# Patient Record
Sex: FEMALE | Race: BLACK OR AFRICAN AMERICAN | NOT HISPANIC OR LATINO | Employment: UNEMPLOYED | URBAN - METROPOLITAN AREA
[De-identification: names, ages, dates, MRNs, and addresses within clinical notes are randomized per-mention and may not be internally consistent; named-entity substitution may affect disease eponyms.]

---
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
## 2024-01-05 ENCOUNTER — OFFICE VISIT (OUTPATIENT)
Dept: OPHTHALMOLOGY | Facility: CLINIC | Age: 19
End: 2024-01-05
Payer: COMMERCIAL

## 2024-01-05 DIAGNOSIS — H52.213 IRREGULAR ASTIGMATISM OF BOTH EYES: ICD-10-CM

## 2024-01-05 DIAGNOSIS — H18.623 KERATOCONUS, UNSTABLE, BILATERAL: Primary | ICD-10-CM

## 2024-01-05 LAB
KMAX (OD): 80 DIOPTERS
KMAX (OS): 81.5 DIOPTERS
PACH THINNEST (OD): 395 MICRONS
PACH THINNEST (OS): 359 MICRONS
SIMK FLAT (OD): 62.8 DIOPTERS
SIMK FLAT (OS): 65.5 DIOPTERS
SIMK STEEP (OD): 68.7 DIOPTERS
SIMK STEEP (OS): 72.9 DIOPTERS

## 2024-01-05 PROCEDURE — 99213 OFFICE O/P EST LOW 20 MIN: CPT | Performed by: OPHTHALMOLOGY

## 2024-01-05 PROCEDURE — 92025 CPTRIZED CORNEAL TOPOGRAPHY: CPT | Performed by: OPHTHALMOLOGY

## 2024-01-05 ASSESSMENT — CONF VISUAL FIELD
OD_SUPERIOR_NASAL_RESTRICTION: 0
OD_INFERIOR_NASAL_RESTRICTION: 0
OS_INFERIOR_TEMPORAL_RESTRICTION: 0
OD_NORMAL: 1
OS_SUPERIOR_NASAL_RESTRICTION: 0
OS_INFERIOR_NASAL_RESTRICTION: 0
OS_SUPERIOR_TEMPORAL_RESTRICTION: 0
OS_NORMAL: 1
OD_INFERIOR_TEMPORAL_RESTRICTION: 0
OD_SUPERIOR_TEMPORAL_RESTRICTION: 0

## 2024-01-05 ASSESSMENT — TONOMETRY
IOP_METHOD: GOLDMANN APPLANATION
OD_IOP_MMHG: 11
OS_IOP_MMHG: 11

## 2024-01-05 ASSESSMENT — REFRACTION_MANIFEST
OS_CYLINDER: -4.75
OD_SPHERE: -4.00
OD_AXIS: 180
OD_CYLINDER: -5.75
OS_SPHERE: -7.50
OS_AXIS: 065

## 2024-01-05 ASSESSMENT — VISUAL ACUITY
OS_PH_SC: 20/200
OD_PH_SC+: -1
OD_PH_SC: 20/100
OD_SC: 20/400
METHOD: SNELLEN - LINEAR
OS_SC: CF

## 2024-01-05 ASSESSMENT — EXTERNAL EXAM - RIGHT EYE: OD_EXAM: NORMAL

## 2024-01-05 ASSESSMENT — SLIT LAMP EXAM - LIDS
COMMENTS: NORMAL
COMMENTS: NORMAL

## 2024-01-05 ASSESSMENT — EXTERNAL EXAM - LEFT EYE: OS_EXAM: NORMAL

## 2024-01-05 NOTE — PROGRESS NOTES
Assessment/Plan   Diagnoses and all orders for this visit:  Keratoconus, unstable, bilateral  Irregular astigmatism of both eyes  -     Corneal Topography - OU - Both Eyes  Some progression noted OD  S/p CXL OS.... some flattening seen OS today on pentacam  Pt does not prefer to wear CLs  Reprinted mrx from Dr. Horn's visit  Strongly advised pt to get CXL OD ASAP

## 2024-01-26 ENCOUNTER — OFFICE VISIT (OUTPATIENT)
Dept: OBSTETRICS AND GYNECOLOGY | Facility: HOSPITAL | Age: 19
End: 2024-01-26
Payer: COMMERCIAL

## 2024-01-26 ENCOUNTER — LAB (OUTPATIENT)
Dept: LAB | Facility: LAB | Age: 19
End: 2024-01-26
Payer: COMMERCIAL

## 2024-01-26 VITALS
SYSTOLIC BLOOD PRESSURE: 123 MMHG | DIASTOLIC BLOOD PRESSURE: 85 MMHG | HEIGHT: 60 IN | WEIGHT: 145 LBS | BODY MASS INDEX: 28.47 KG/M2

## 2024-01-26 DIAGNOSIS — N95.0 POSTMENOPAUSAL BLEEDING: ICD-10-CM

## 2024-01-26 DIAGNOSIS — Z76.89 ENCOUNTER TO ESTABLISH CARE: ICD-10-CM

## 2024-01-26 DIAGNOSIS — Z01.419 WELL WOMAN EXAM: Primary | ICD-10-CM

## 2024-01-26 DIAGNOSIS — N92.6 IRREGULAR PERIODS: ICD-10-CM

## 2024-01-26 LAB
DHEA-S SERPL-MCNC: 326 UG/DL (ref 65–395)
EST. AVERAGE GLUCOSE BLD GHB EST-MCNC: 100 MG/DL
ESTRADIOL SERPL-MCNC: 63 PG/ML
FSH SERPL-ACNC: 5.3 IU/L
HBA1C MFR BLD: 5.1 %
PROLACTIN SERPL-MCNC: 6.4 UG/L (ref 3–20)
TSH SERPL-ACNC: 0.48 MIU/L (ref 0.44–3.98)

## 2024-01-26 PROCEDURE — 99395 PREV VISIT EST AGE 18-39: CPT | Performed by: ADVANCED PRACTICE MIDWIFE

## 2024-01-26 PROCEDURE — 82627 DEHYDROEPIANDROSTERONE: CPT

## 2024-01-26 PROCEDURE — 83001 ASSAY OF GONADOTROPIN (FSH): CPT

## 2024-01-26 PROCEDURE — 1036F TOBACCO NON-USER: CPT | Performed by: ADVANCED PRACTICE MIDWIFE

## 2024-01-26 PROCEDURE — 84402 ASSAY OF FREE TESTOSTERONE: CPT

## 2024-01-26 PROCEDURE — 83498 ASY HYDROXYPROGESTERONE 17-D: CPT

## 2024-01-26 PROCEDURE — 82670 ASSAY OF TOTAL ESTRADIOL: CPT

## 2024-01-26 PROCEDURE — 84146 ASSAY OF PROLACTIN: CPT

## 2024-01-26 PROCEDURE — 99213 OFFICE O/P EST LOW 20 MIN: CPT | Performed by: ADVANCED PRACTICE MIDWIFE

## 2024-01-26 PROCEDURE — 36415 COLL VENOUS BLD VENIPUNCTURE: CPT

## 2024-01-26 PROCEDURE — 84443 ASSAY THYROID STIM HORMONE: CPT

## 2024-01-26 PROCEDURE — 83036 HEMOGLOBIN GLYCOSYLATED A1C: CPT

## 2024-01-26 NOTE — PROGRESS NOTES
Subjective   18 y.o. yo nullip presents for well woman exam.   Concerns: was seen at [facility] and told she had PCOS d/t irregular menses and hair growth. Did not have labs or US. Patient is concerned about fertility and desires pregnancy. Reports she was trying but has stopped until she figures out what is going on.     Patient's last menstrual period was 2024.   Periods are  irregular - has phases of regular periods, then will skip months, had period twice in 1 month , lasting 7 days.   Dysmenorrhea:mild, occurring premenstrually and first 1-2 days of flow.   Cyclic symptoms include none.   Periods have been irregular for about 1 year  Went on depo in 9th grade - did not like side effects of hormones and weight gain     Sexual Activity: not sexually active in the last 7 months. Previously sexually active with men  Pain with intercourse? No     History of prior STI: gonorrhea    Current contraception: none    Last pap: n/a  Family history of uterine or ovarian cancer: no  Family history of breast cancer: no  Family history of colon cancer: no  Menstrual History:  OB History          0    Para   0    Term   0       0    AB   0    Living   0         SAB   0    IAB   0    Ectopic   0    Multiple   0    Live Births   0                Menarche age: 10  Patient's last menstrual period was 2024.       Objective   /85   Ht 1.524 m (5')   Wt 65.8 kg (145 lb)   LMP 2024   BMI 28.32 kg/m²   OBGyn Exam   General: alert and oriented, in no acute distress  Thyroid: thyroid exam: normal to inspection and palpation  Lungs: clear to auscultation bilaterally and normal respiratory effort   Breasts: breasts appear normal for pregnancy, no suspicious masses, no skin or nipple changes or axillary nodes, not examined  Abdomen: soft, non-tender, without masses or organomegaly  Neuro: age-appropriate affect, behavior and speech, no gross motor deficits, normal gait  Psych: psych exam:  alert,oriented, in NAD with a full range of affect, normal behavior and no psychotic features      Assessment/Plan   Problem List Items Addressed This Visit             ICD-10-CM    Well woman exam - Primary Z01.419      Irregular periods     N92.6    Relevant Orders    17-Hydroxyprogesterone    DHEA-Sulfate    Estradiol    Follicle Stimulating Hormone    Hemoglobin A1C    Prolactin    Testosterone,Free and Total    TSH with reflex to Free T4 if abnormal    US pelvis    Encounter to establish care     Z76.89    Relevant Orders    Referral to Primary Care        Discussed diagnosis and treatment of PCOS. Will begin with labs and pelvic US. Patient to return in 1 month to discuss results and recommended follow up/treatment options.     Asuncion Veliz, APRN-CNM, APRN-CNP

## 2024-01-26 NOTE — LETTER
January 26, 2024     Patient: Shyann Fulton   YOB: 2005   Date of Visit: 1/26/2024       To Whom It May Concern:    Shyann Fulton was seen in my clinic on 1/26/2024 at 11:30am. Please excuse Shyann for her absence from school on this day to make the appointment.    If you have any questions or concerns, please don't hesitate to call.         Sincerely,         Asuncion Veliz, KJ-CNDARLING, KJ-CNP        CC: No Recipients

## 2024-01-31 ENCOUNTER — TELEPHONE (OUTPATIENT)
Dept: OBSTETRICS AND GYNECOLOGY | Facility: HOSPITAL | Age: 19
End: 2024-01-31
Payer: COMMERCIAL

## 2024-01-31 LAB
17OHP SERPL-MCNC: 75.23 NG/DL
TESTOSTERONE FREE (CHAN): 8.7 PG/ML (ref 0.1–6.4)
TESTOSTERONE,TOTAL,LC-MS/MS: 57 NG/DL (ref 2–45)

## 2024-02-01 NOTE — TELEPHONE ENCOUNTER
Verified patient by name and .   Patient calling in regarding results.   Let patient know Asuncion is currently out of the office and the covering provider just got back to me regarding results.   Per Flori Mcgovern CNM, results are consistent with PCOS.  Let patient know she can discuss next steps with Asuncion at her follow up visit she has scheduled.   Patient also inquiring of whether she shoukld get US scheduled for tomorrow.   Let patient know she should still get US and Asuncion can discuss results at follow up appointment scheduled.

## 2024-02-02 ENCOUNTER — APPOINTMENT (OUTPATIENT)
Dept: RADIOLOGY | Facility: HOSPITAL | Age: 19
End: 2024-02-02
Payer: COMMERCIAL

## 2024-02-13 ENCOUNTER — APPOINTMENT (OUTPATIENT)
Dept: RADIOLOGY | Facility: HOSPITAL | Age: 19
End: 2024-02-13
Payer: COMMERCIAL

## 2024-02-13 ENCOUNTER — OFFICE VISIT (OUTPATIENT)
Dept: OPHTHALMOLOGY | Facility: CLINIC | Age: 19
End: 2024-02-13
Payer: COMMERCIAL

## 2024-02-13 DIAGNOSIS — H52.213 IRREGULAR ASTIGMATISM OF BOTH EYES: ICD-10-CM

## 2024-02-13 DIAGNOSIS — H18.623 KERATOCONUS, UNSTABLE, BILATERAL: Primary | ICD-10-CM

## 2024-02-13 PROCEDURE — 1036F TOBACCO NON-USER: CPT | Performed by: STUDENT IN AN ORGANIZED HEALTH CARE EDUCATION/TRAINING PROGRAM

## 2024-02-13 PROCEDURE — V2531 CONTACT LENS GAS PERMEABLE: HCPCS | Performed by: STUDENT IN AN ORGANIZED HEALTH CARE EDUCATION/TRAINING PROGRAM

## 2024-02-13 PROCEDURE — 99212 OFFICE O/P EST SF 10 MIN: CPT | Performed by: STUDENT IN AN ORGANIZED HEALTH CARE EDUCATION/TRAINING PROGRAM

## 2024-02-13 ASSESSMENT — REFRACTION_MANIFEST
OD_SPHERE: -4.00
OD_AXIS: 180
OS_AXIS: 065
OS_SPHERE: -7.50
OD_CYLINDER: -5.75
OS_CYLINDER: -4.75

## 2024-02-13 ASSESSMENT — VISUAL ACUITY
OS_SC: 20/200
METHOD: SNELLEN - LINEAR
OD_SC: 20/80

## 2024-02-13 ASSESSMENT — ENCOUNTER SYMPTOMS
CARDIOVASCULAR NEGATIVE: 0
EYES NEGATIVE: 1
RESPIRATORY NEGATIVE: 0
NEUROLOGICAL NEGATIVE: 0
ALLERGIC/IMMUNOLOGIC NEGATIVE: 0
ENDOCRINE NEGATIVE: 0
CONSTITUTIONAL NEGATIVE: 0
MUSCULOSKELETAL NEGATIVE: 0
PSYCHIATRIC NEGATIVE: 0
HEMATOLOGIC/LYMPHATIC NEGATIVE: 0
GASTROINTESTINAL NEGATIVE: 0

## 2024-02-13 ASSESSMENT — CONF VISUAL FIELD
OD_NORMAL: 1
OS_INFERIOR_TEMPORAL_RESTRICTION: 0
OD_INFERIOR_TEMPORAL_RESTRICTION: 0
METHOD: COUNTING FINGERS
OS_INFERIOR_NASAL_RESTRICTION: 0
OS_SUPERIOR_NASAL_RESTRICTION: 0
OD_SUPERIOR_NASAL_RESTRICTION: 0
OD_SUPERIOR_TEMPORAL_RESTRICTION: 0
OS_NORMAL: 1
OD_INFERIOR_NASAL_RESTRICTION: 0
OS_SUPERIOR_TEMPORAL_RESTRICTION: 0

## 2024-02-13 ASSESSMENT — REFRACTION_WEARINGRX
OS_SPHERE: -12.25
OD_CYLINDER: +5.75
OD_SPHERE: -9.75
OS_CYLINDER: +4.75
OD_AXIS: 090
OS_AXIS: 155

## 2024-02-13 ASSESSMENT — EXTERNAL EXAM - RIGHT EYE: OD_EXAM: NORMAL

## 2024-02-13 ASSESSMENT — SLIT LAMP EXAM - LIDS
COMMENTS: NORMAL
COMMENTS: NORMAL

## 2024-02-13 ASSESSMENT — EXTERNAL EXAM - LEFT EYE: OS_EXAM: NORMAL

## 2024-02-13 NOTE — PROGRESS NOTES
Keratoconus, unstable, bilateral  Irregular astigmatism of both eyes  -patient referred by cornea for a CL exam; no prior h/o of CL wear  -patient is s/p CXL OS with Dr. Godinez  -BCVA in specs is OD 20/100 OS 20/200  -Patient fit into trial lenses of [employer] VS with good comfort/fit/and VA on over-refraction  -BCVA in scleral lenses OD 20/30 OS 20/25   -with greater than 2 lines of improvement in BCVA this lens is being fit as medically necessary and not for cosmetic purposes    [place] 8/18/23:  OD: K1: 59.3 K2: 65.4 KMAX: 80.0 Thinnest PACH: 405um, (+)inf corneal steepening c irregular astigmatism  OS: K1: 65.6 K2: 75.2 Thinnest PACH: 361 (+)inf corneal steepening c irregular astigmatism    Will order lenses from [employer] and have patient RTC for a CL f/u exam when lenses arrive:  [employer] VS  OD 8.4 16.0 +1.50 3300 34-40  OS 8.4 16.0 +1.00-3.00x080 3500 38-44    Dispensed trials of soft CL for patient to use while waiting. Successful I&R today.  Discussed proper wear, care, replacement of contact lenses.    RTC for CL f/u exam when lenses arrive.

## 2024-02-14 ASSESSMENT — REFRACTION_CURRENTRX
OD_SPHERE: +1.50
OD_CYLINDER: -3.25
OS_SPHERE: +1.00
OS_AXIS: 080
OS_DIAMETER: 16.0
OS_SPHERE: -7.00
OD_DIAMETER: 14.5
OS_BRAND: SYNERGEYES VS
OD_BRAND: SYNERGEYES VS
OS_DIAMETER: 14.5
OD_BRAND: BIOFINITY TORIC XR
OS_AXIS: 070
OS_CYLINDER: -3.00
OD_BASECURVE: 8.4
OD_AXIS: 180
OS_CYLINDER: -1.75
OD_SPHERE: -4.00
OS_BASECURVE: 8.7
OD_BASECURVE: 8.7
OS_BRAND: BIOFINITY TORIC XR
OD_DIAMETER: 16.0
OS_BASECURVE: 8.4

## 2024-02-14 ASSESSMENT — VISUAL ACUITY
VA_OR_OS_CURRENT_RX: 20/100+2
VA_OR_OD_CURRENT_RX: 20/100

## 2024-02-22 ENCOUNTER — HOSPITAL ENCOUNTER (OUTPATIENT)
Dept: RADIOLOGY | Facility: CLINIC | Age: 19
End: 2024-02-22
Payer: COMMERCIAL

## 2024-02-22 ENCOUNTER — HOSPITAL ENCOUNTER (OUTPATIENT)
Dept: RADIOLOGY | Facility: CLINIC | Age: 19
Discharge: HOME | End: 2024-02-22
Payer: COMMERCIAL

## 2024-02-22 DIAGNOSIS — N95.0 POSTMENOPAUSAL BLEEDING: ICD-10-CM

## 2024-02-22 PROCEDURE — 76830 TRANSVAGINAL US NON-OB: CPT | Performed by: RADIOLOGY

## 2024-02-22 PROCEDURE — 76856 US EXAM PELVIC COMPLETE: CPT

## 2024-02-22 PROCEDURE — 76856 US EXAM PELVIC COMPLETE: CPT | Performed by: RADIOLOGY

## 2024-02-23 ENCOUNTER — APPOINTMENT (OUTPATIENT)
Dept: OBSTETRICS AND GYNECOLOGY | Facility: HOSPITAL | Age: 19
End: 2024-02-23
Payer: COMMERCIAL

## 2024-02-26 DIAGNOSIS — N83.201 RIGHT OVARIAN CYST: Primary | ICD-10-CM

## 2024-03-15 ENCOUNTER — APPOINTMENT (OUTPATIENT)
Dept: OBSTETRICS AND GYNECOLOGY | Facility: HOSPITAL | Age: 19
End: 2024-03-15
Payer: COMMERCIAL

## 2024-04-02 ENCOUNTER — OFFICE VISIT (OUTPATIENT)
Dept: OPHTHALMOLOGY | Facility: CLINIC | Age: 19
End: 2024-04-02
Payer: COMMERCIAL

## 2024-04-02 DIAGNOSIS — H18.623 KERATOCONUS, UNSTABLE, BILATERAL: Primary | ICD-10-CM

## 2024-04-02 DIAGNOSIS — H52.213 IRREGULAR ASTIGMATISM OF BOTH EYES: ICD-10-CM

## 2024-04-02 PROCEDURE — FCCLS CONTACT LENS F/U VISIT: Performed by: STUDENT IN AN ORGANIZED HEALTH CARE EDUCATION/TRAINING PROGRAM

## 2024-04-02 ASSESSMENT — VISUAL ACUITY
OD_CC: 20/60
VA_OR_OD_CURRENT_RX: 20/25+
METHOD: SNELLEN - LINEAR
OS_CC: 20/60
CORRECTION_TYPE: CONTACTS
VA_OR_OS_CURRENT_RX: 20/30+2
OS_CC+: +2

## 2024-04-02 ASSESSMENT — ENCOUNTER SYMPTOMS
PSYCHIATRIC NEGATIVE: 0
ALLERGIC/IMMUNOLOGIC NEGATIVE: 0
MUSCULOSKELETAL NEGATIVE: 0
CARDIOVASCULAR NEGATIVE: 0
ENDOCRINE NEGATIVE: 0
HEMATOLOGIC/LYMPHATIC NEGATIVE: 0
EYES NEGATIVE: 0
GASTROINTESTINAL NEGATIVE: 0
NEUROLOGICAL NEGATIVE: 0
RESPIRATORY NEGATIVE: 0
CONSTITUTIONAL NEGATIVE: 0

## 2024-04-02 ASSESSMENT — REFRACTION_CURRENTRX
OD_SPHERE: +1.50
OS_DIAMETER: 16.0
OS_AXIS: 080
OD_BASECURVE: 8.4
OD_DIAMETER: 16.0
OS_BRAND: SYNERGEYES VS
OD_CYLINDER: SPHERE
OS_SPHERE: +1.00
OS_BASECURVE: 8.4
OS_CYLINDER: -3.00
OD_BRAND: SYNERGEYES VS

## 2024-04-02 ASSESSMENT — CONF VISUAL FIELD
OS_INFERIOR_TEMPORAL_RESTRICTION: 0
OS_INFERIOR_NASAL_RESTRICTION: 0
OD_INFERIOR_TEMPORAL_RESTRICTION: 0
OD_INFERIOR_NASAL_RESTRICTION: 0
OS_NORMAL: 1
OD_NORMAL: 1
OS_SUPERIOR_NASAL_RESTRICTION: 0
OS_SUPERIOR_TEMPORAL_RESTRICTION: 0
OD_SUPERIOR_TEMPORAL_RESTRICTION: 0
OD_SUPERIOR_NASAL_RESTRICTION: 0

## 2024-04-02 ASSESSMENT — SLIT LAMP EXAM - LIDS
COMMENTS: NORMAL
COMMENTS: NORMAL

## 2024-04-02 ASSESSMENT — EXTERNAL EXAM - RIGHT EYE: OD_EXAM: NORMAL

## 2024-04-02 ASSESSMENT — EXTERNAL EXAM - LEFT EYE: OS_EXAM: NORMAL

## 2024-04-02 NOTE — PATIENT INSTRUCTIONS
Dr. Delia Toledo        Appointments:   [phone]  Contact Lens Orders:  [phone]       GAS PERMEABLE CONTACT LENSES  CARE AND GENERAL GUIDELINES  General Contact Lens Guidelines    Any time a contact lens is removed from the eye, it must be cleaned and disinfected before being reinserted    Always wash hands before handling contact lenses.  Avoid soaps containing additives such as lotions, creams, oils or perfumes. Anti-bacterial soaps are recommended    Whenever lenses have been removed from the case, discard the solution, rinse the case vigorously with saline or disinfecting solution, wipe with clean, dry tissue and allow it to air dry upside down.      Replace lens case monthly.  It is critical to keep your lens case CLEAN!     Routine replacement of the contact lens case can help to reduce the risk of contact lens contamination    Use only commercially prepared saline and cleaning solutions, never use homemade or generic saline    Never reuse solutions after one cleaning/disinfection cycle    Never use saliva or water to moisten a contact lens, never put a contact lens in your mouth.  Doing any of these may lead to an eye infection    Patients should always check with us before changing solutions    Contact lenses should never be stored in non-sterile fluids such as distilled water, tap water, bottled water or home purified water    Lenses or cases should not be rinsed in tap water.  Traditional contact lens approved saline solutions may be used to rinse  off lenses or if needed prior to insertion.    Lens lubricating/rewetting drops can be placed directly into the eye while contact lenses are being worn to increase lens wearing comfort    Do not sleep in you lenses unless you have been fit with lenses specifically designed for extended-wear and your doctor has approved them for  that purpose    Avoid swimming pools and hot tubs while wearing your lenses    All contact lens wearers, with few exceptions, need a pair of spectacles for emergencies and for rest from contact lenses    Contact lens wearers should have an eye exam annually or sooner, if indicated by your doctor    Cosmetics:     Apply makeup after inserting contacts and remove contact lenses before removing makeup.  This will help prevent transfer of these substances from your hands to the lens surface.    Use a good quality, water soluble mascara rather than waterproof or water-resistant types.  Replace old mascara every three months as it may become contaminated and cause infection.    Avoid aerosol sprays when your contacts are in, or remember to shield your eyes.  Walk away from the area where the spray was used since a mist of spray often lingers in the air.  If possible, use hairspray before inserting your contact lenses.    Adaptation:    Complete adaptation to contact lenses normally takes from 2-6 weeks, and some patients may take up to 12 weeks.  Normal adaptation symptoms include:    A sensation that feels much like a small eyelash is in the eye.  This feeling gradually disappears    Vision may be a little watery and may change as you blink    You may notice mild redness, tearing or itching of the eyes    Awareness of lens movement or increased blinking may be noticed.  It is important that you continue to blink fully and completely    You may be sensitive to bright light.  This sensitivity will lessen, but a good non-prescription pair of sunglasses will often provide the greatest comfort outdoors    You may experience halos or edge awareness while adapting    Some patients have temporarily improved uncorrected vision when they remove their lenses    You may notice variable vision when you remove your lenses.  This may include increased blurred vision with no glasses or with your habitual glasses, which lasts from 30  minutes to up to 4 hours after rigid lenses are removed.  This effect is greatest with patients that have had corneal surgery.    Signs of Caution: If you are ever unsure about whether what you are experiencing is part of normal adaptation, please call your doctor.    Persistent or severe redness  Continued or excessive tearing  Extreme light sensitivity  Inability to open eyes   Pain      REMEMBER: If in DOUBT, take your lenses OUT!            Lens Wear:    We have recommended a schedule for wearing the lenses during the adaptation period.  This consists of slowly increasing the wearing time so your eyes adapt properly to the lenses.  How rapidly the wearing time increases depends upon the type of lens being worn and the specific characteristics of your eyes.  Follow the wearing schedule below unless your doctor tells you otherwise:    Day One: 4 hours  Day Two: 5 hours  Day Three: 6 hours  Day Four: 7 hours  Day Five: 8 hours  Day Six: 9 hours  Day Seven: 10 hours    After Day Seven you will remain at 10 hours of wearing time maximum until your scheduled follow-up appointment.    REWETTING DROPS FOR COMFORT:    *Acceptable rigid gas permeable rewetting drops:    Beggs Rewetting Drops   Refresh Relieva for Contacts Rewetting Drops   Blink for Contacts Rewetting Drops                *We do not recommend artificial tears to be used while the lenses are worn.    NOTE for Saline Rinses:     You may purchase a commercially available saline such as Bausch & Lomb Sensitive Eyes Saline in a drug store, or a single use non-preserved saline solution such as Lacripure (by [employer]) or ScleralFil (by Bausch & Lomb) available at our office or on Amazon    DO NOT STORE LENSES OVERNIGHT IN SALINE SOLUTION, IT IS USED FOR RINSES ONLY      FOR TRADITIONAL RIGID GAS PERMEABLE CONTACT LENSES  (These are the smaller rigid lens types that sit directly on your cornea)    INSERTION  o Wash hands with lotion free soap and DRY HANDS.   o  Place lens in the palm of your hand.   o Rinse lens (if needed) and drain excess solution by cupping your hand.   o Place lens on DRY index finger of dominant hand  o Use your middle finger of the non-dominant hand to hold up your upper lashes and your      other middle finger to pull your lower lid down.   o Place lens directly on the colored part of your eye. Pull your index finger away and slowly release your lids     [place] and UNIQUE pH USERS: If the lens has been soaking overnight, you may insert the lens directly onto your eye in the morning with the [place] or Unique pH conditioning solution still on the lens, or you may rinse the lens in fresh rewetting/conditioning solution and then place it directly onto the eye.    CLEAR CARE USERS: If the lens has been fully neutralized, we suggest another rinse with fresh saline or a conditioning solution ([place] or Unique pH) prior to insertion     REMOVAL  BLINK METHOD  Tilt your head down while looking into a mirror flat on a table.    Cup your hand under the eye to catch the lens as it is removed from the eye.  Open your eyes wide and pull tightly (pull out and up) at the outer corner of your eye.  Blink hard.  The lens should pop out    TWO HAND METHOD  Tilt your head down while looking into a mirror flat on a table.  Place the index finger of one hand on the upper eyelid directly next to the lash line and above the contact lens.  Place the index finger of the other hand similarly on the lower lid, but below the contact lens.  (You must be very close to your lash line!  Do not invert your lids to show the inner pink portion underneath!)  Gently press the lids against the white part of the eye.  Maintain pressure against the eye and gently bring the upper and lower lids together to squeeze the lens out.  The lens should pop out.    CLEANING ….If cleaning lenses over a sink make sure that the drain is plugged!!!    IF USING [place] ORIGINAL OR [place]ON ADVANCE  Remove  lens and place in palm of hand  Place 2 drops of daily  on lens (maroon cap, red tip)   Rub lens gently for 15 seconds  Rinse lens with saline  Place lens in contact lens case with fresh conditioning solution (blue cap, white tip) overnight (at least 6 hours)    IF USING [place] SIMPLUS or UNIQUE pH**  Remove lens and place in palm of hand  Place 2 drops of [place] Simplus or Unique pH solution on lens  Rub lens gently for 15 seconds, rinse with saline  Place lens in contact lens case with fresh [place] Simplus or Unique pH solution overnight (at least 6 hours)      IF USING CLEAR CARE    Rub lenses with Clear Care solution (watch touching eyes after this because this is hydrogen peroxide and if it touches your eye directly it will sting!0  Rinse lenses with saline solution  Place lenses in appropriate side of basket marked right or left.  Fill clear vial to the line with Clear Care disinfecting solution.  Place contacts in disinfecting solution (foaming will occur, don’t be alarmed if vial overflows) for at least 6 hours  The disk neutralizes the disinfecting solution after 6 hours  Rinse the contacts with a commercially prepared saline prior to insertion and a RGP approved rewetting or conditioning solution may be used to insert  The container and disk must be discarded whenever you purchase new /rinse and disinfectant.    Not changing/discarding the disk will result in traces of disinfectant on the lenses even after a saline rinse.  You would then experience mild burning and redness      NOTE for Saline Rinses:     You may purchase a commercially available saline such as Bausch & Lomb Sensitive Eyes Saline in a drug store, or a single use non-preserved saline solution such as Lacripure (by [employer]) or ScleralFil (by Bausch & Lomb) available at our office or on Amazon    DO NOT STORE LENSES OVERNIGHT IN SALINE SOLUTION, IT IS USED FOR RINSES ONLY    IF YOUR DOCTOR HAS SUGGESTED USING PROGENT  TREATMENTS:    If using Progent, it is very important to follow the instruction in the package;    Place lenses in the proper side (R/L) on the cap of the case  Mix solution A and B at the same time into the case  Put cap on case and shake vigorously for 1 minute  Leave on countertop for ONLY 30 minutes  When 30 minutes is complete uncap the case and throw the solution directly down the drain followed by water  Rinse lenses thoroughly with Saline Solution  DISINFECT and STORE lenses overnight in your prescribed lens care solution as listed above prior to insertion  You can buy PROGENT through our office or [email or web address] with the following code for the  Eye [place] [id number]    IF YOUR LENS IS COATED WITH HYDRAPEG THE ONLY SOLUTIONS YOU CAN USE ARE…   o [place] Simplus   o Unique Ph   o Clear Care    * do not use additional abrasive (like [place] [red top] ) or alcohol based  with either option   * Progent will remove the HydraPeg Coating       IF YOU WEAR….PIGGYBACK CONTACT LENSES    *Insert soft contact lens first!    INSERTING SOFT CONTACT LENSES    Wash hands with lotion free soap and DRY HANDS.  Place lens in the palm of your hand.  Rinse lens and drain excess solution by cupping your hand.  Place lens on DRY index finger  Lens should look like a bowl with no sides touching your finger  Use your middle finger to hold up your upper lashes and your other middle finger to pull your lower lid down.  Place lens directly on the colored part of your eye.  Pull your index finger away and while continuing to hold your lids look up, down, left and right.   Once both soft lenses are in place rigid gas permeable lens right on top of it  Follow steps for insertion of rigid gas permeable lenses     REMOVING SOFT CONTACT LENSES  *Remove the rigid lens using the above instructions of rigid gas permeable lenses   *Once your rigid contact lens is removed you may remove your soft contact  lens.  Hands should already be washed with lotion free soap and dried  Use your middle finger to hold up your upper lashes and your other middle finger to pull your lower lid down.  Using your thumb and index finger pinch the edge of the lens and pull it off your eye.  Follow the recommended cleaning and storage procedures (if not a daily disposable soft lens) and repeat for the other eye.    CLEANING… OF SOFT & RIGID PIGGYBACK CONTACT LENSES  *Clean and store the rigid lens as listed in this packet. Many soft lenses worn as piggybacks are now daily disposables and do not require cleaning, if soft lenses are not daily disposables follow the instructions below.     VERY IMPORTANT: When a rigid lens is placed atop the soft lens, it MUST be rinsed with saline or multipurpose soft lens solutions prior to inserting the rigid lens.  That is, no not insert the RGP lens on top of the soft with rigid lens solutions such as [place], Unique Ph or Optimum.     SOFT CONTACT LENSES:  Follow these steps for cleaning and storing the soft lens (unless they daily disposable, those are discarded nightly and not re-cleaned).  Once lens is removed place it in palm of hand  Place 5 drops of soft lens multipurpose solution on lens and gently rub for 15 seconds  Rinse lenses using soft lens multipurpose solution  Place lens in case filled with fresh soft multipurpose lens solution  Soak lenses 6 hours to disinfect   Remove lenses from case and rinse with fresh soft multipurpose solution prior to insertion  Discard used solution from case and wipe case dry until next use       Recommended Soft Contact Lens Multipurpose Solutions:     Optifree Express   Optifree Replenish   Optifree PureMoist   BioTrue   Malka   Revitalens      Note: Your doctor may recommend another soft lens disinfecting solution; If Clear Care is recommended follow the instructions for Clear Care above.      IF YOU WEAR….SYNERGEYES (HYBRID) CONTACT LENSES   See this website  for video instructions on SynergEyes Care and Handling: [email or web address]    INSERTING SYNERGEYES LENSES:     TWO FINGER METHOD  Place lens concave side up in between your index and middle finger  Fill lens to the top with unit dose non-preserved saline (Lacripure or ScleralFil preferred)  Place head parallel with floor and hold lids open   Gently push lens up to eye  Hold for 5 seconds then remove fingers from lens    TRIPOD METHOD  Place lens concave side up balancing between your thumb, middle and index fingers to form a tripod to balance on  Fill lens to the top with unit dose non-preserved saline (Lacripure or ScleralFil preferred)  Place head parallel with floor and hold lids open   Gently push lens up to eye  Hold for 5 seconds then fingers from lens     PLUNGER METHOD  Place lens concave side up on large plunger-tang  Fill lens to the top with unit dose non-preserved saline (Lacripure or ScleralFil preferred)  Place head parallel with floor and hold lids open   Gently push lens and plunger up to eye  Hold for 5 seconds then remove plunger-tang    REMOVING SYNERGEYES LENSES:   Place your left middle finger to hold up your upper lashes and your right middle finger to pull your lower lid down  Looking straight ahead or slightly up, use the pads of your thumb and index finger to pinch the lower edge of soft skirt and pull it off your eye  Using a narrow pinch and VERY dry hands will make removal easier  Do not use a plunger to remove SynergEyes lenses    CLEANING… If cleaning lenses over a sink make sure that the drain is plugged!!!  *BioTrue, Optifree Express, or ClearCare are recommended for use with Synergeyes lenses.    IF USING OPTIFREE EXPRESS OR BIOTRUE  Place lens concave side up on palm of hand  Gently rub lens for 5 seconds with multipurpose solution  Rinse thoroughly with multipurpose solution  Place in contact lens case with fresh multipurpose solution for 6  hours    IF USING CLEAR CARE  Rub lenses with Clear Care solution (watch touching eyes after this because this is hydrogen peroxide and if it touches your eye directly it will sting!)  Rinse lenses with saline solution  Place lenses in appropriate side of basket marked right or left.  Fill clear vial to the line with Clear Care disinfecting solution.  Place contacts in disinfecting solution (foaming will occur, don’t be alarmed if vial overflows) for at least 6 hours  The disk neutralizes the disinfecting solution after 6 hours  Rinse the contacts with a commercially prepared saline prior to insertion and a [facility] approved rewetting or conditioning solution may be used to insert  The container and disk must be discarded whenever you purchase new /rinse and disinfectant.    Not changing/discarding the disk will result in traces of disinfectant on the lenses even after a saline rinse.  You would then experience mild burning and redness    IF YOU WEAR….SCLERAL CONTACT LENSES     *VERY IMPORTANT*: ONLY NON-PRESERVED UNIT DOSE SALINE IS USED TO INSERT SCLERAL LENSES, NO OTHER PRESERVED SALINE OR MULTIPUPROSE SOLUTIONS ARE ALLOWED!    INSERTING SCLERAL LENSES     AGUIRRE METHOD  Wash hands  Place lens concave side up on a clean large aguirre  Fill lens to the top with unit dose non-preserved saline (Lacripure or ScleralFil preferred)*  Place head parallel with floor and hold lids open with one hand  Gently push lens up on to eye  Will feel cold as saline touches eye  Remove aguirre  from eye and lift head slowly  If bubble is in chamber repeat insertion with more saline filling lens     TRIPOD METHOD  Place lens concave side up resting on thumb, pointer finger and middle finger or on pointer and middle finger  Fill lens to top with unit dose non-preserved saline (Lacripure or ScleralFil preferred)*  Place head parallel with floor and hold lids open  Gently push lens up on to eye  Will feel cold as saline touches eye  If  bubble is in chamber repeat insertion with more unit dose non-preserved saline filling lens    REMOVING SCLERAL LENSES     SUCTION CUP METHOD  Use lower lid to loosen inferior lens edge at 6 o’clock, try to create an air bubble behind the lens    Place clean suction cup at 6 o’clock position of lens  Gently tilt lens off of eye     TWO HAND METHOD  Hold eye wide open   Look down and push upper lid under lens to break seal  Can also be accomplished by pushing in on the white part of the eye below the lens to break the seal  Keep upper lid firmly against top of lens  Use lower lid to push beneath and under the lower edge of the lens, the lens should pop out of the eye    CLEANING… SCLERAL LENSES   Use only [place], Unique pH**, or Clear Care cleaning solutions; Follow steps above for specific cleaning system    For more information or help on insertion and removal and troubleshooting of scleral lenses refer to [email or web address].org  * Your doctor may prescribe unit dose saline through your pharmacy   *Lacripure may be purchased at [email or web address], or from our office, or [employer]  * ScleralFil is made by Bausch & Lomb and may be purchased at [employer] or from our office   **Unique ph may be purchased at [email or web address]  or from our office or Amazon.      CONTACT LENS FEES/CREDIT POLICIES:    All contact lens material fees are due the day that lenses are ordered, with the exception of eligible Medicare patients or medically indicated lenses. Fitting fees are due by the final fitting visit.        Standard rigid lenses are warranted for 90 days from the original order date against breakage, parameter changes or cancellation at 100%.  Lost lenses cannot be credited.  Specialty rigid lenses will be credited at 75% against cancellation within the 90 day warranty period.      Fitting fees are professional service fees which cannot be credited once a fitting is initiated.          The cost of my lenses  per eye are $_________________.    The cost of my entire fitting fee is $_________________.    Patient signature: _____________________________ Date: ________________    Tech/Doctor’s Signature:  ________________________ Date: ________________

## 2024-04-02 NOTE — PROGRESS NOTES
Assessment/Plan   Diagnoses and all orders for this visit:  Keratoconus, unstable, bilateral  Irregular astigmatism of both eyes  -good comfort/fit/VA with Synergeyes Scleral VS OU  -BCVA 20/30 OD+OS. Small over-refraction found but patient does not notice a difference when trial framed  -dispensed scleral lenses  -successful I&R today-discussed proper care and hygiene  -discussed Addipak sterile saline solution for filling and [place] Simplus for cleaning  -discussed not sleeping/showering/swimming with lenses    RTC 1 year for annual with DFE, MRX, CL exam (sooner for any lens problems)

## 2024-04-15 ENCOUNTER — APPOINTMENT (OUTPATIENT)
Dept: PRIMARY CARE | Facility: CLINIC | Age: 19
End: 2024-04-15
Payer: COMMERCIAL

## 2024-05-22 ENCOUNTER — HOSPITAL ENCOUNTER (EMERGENCY)
Facility: HOSPITAL | Age: 19
Discharge: HOME | End: 2024-05-22
Payer: COMMERCIAL

## 2024-05-22 VITALS
TEMPERATURE: 97.2 F | BODY MASS INDEX: 28.27 KG/M2 | HEIGHT: 60 IN | RESPIRATION RATE: 18 BRPM | OXYGEN SATURATION: 99 % | WEIGHT: 144 LBS | DIASTOLIC BLOOD PRESSURE: 71 MMHG | HEART RATE: 83 BPM | SYSTOLIC BLOOD PRESSURE: 124 MMHG

## 2024-05-22 PROCEDURE — 4500999001 HC ED NO CHARGE

## 2024-05-22 ASSESSMENT — PAIN DESCRIPTION - PAIN TYPE: TYPE: ACUTE PAIN

## 2024-05-22 ASSESSMENT — COLUMBIA-SUICIDE SEVERITY RATING SCALE - C-SSRS
1. IN THE PAST MONTH, HAVE YOU WISHED YOU WERE DEAD OR WISHED YOU COULD GO TO SLEEP AND NOT WAKE UP?: NO
2. HAVE YOU ACTUALLY HAD ANY THOUGHTS OF KILLING YOURSELF?: NO
6. HAVE YOU EVER DONE ANYTHING, STARTED TO DO ANYTHING, OR PREPARED TO DO ANYTHING TO END YOUR LIFE?: NO

## 2024-05-22 ASSESSMENT — LIFESTYLE VARIABLES
HAVE PEOPLE ANNOYED YOU BY CRITICIZING YOUR DRINKING: NO
EVER HAD A DRINK FIRST THING IN THE MORNING TO STEADY YOUR NERVES TO GET RID OF A HANGOVER: NO
EVER FELT BAD OR GUILTY ABOUT YOUR DRINKING: NO
HAVE YOU EVER FELT YOU SHOULD CUT DOWN ON YOUR DRINKING: NO
TOTAL SCORE: 0

## 2024-05-22 ASSESSMENT — PAIN DESCRIPTION - FREQUENCY: FREQUENCY: CONSTANT/CONTINUOUS

## 2024-05-22 ASSESSMENT — PAIN DESCRIPTION - LOCATION: LOCATION: HEAD

## 2024-05-22 ASSESSMENT — PAIN SCALES - GENERAL: PAINLEVEL_OUTOF10: 7

## 2024-05-22 ASSESSMENT — PAIN - FUNCTIONAL ASSESSMENT: PAIN_FUNCTIONAL_ASSESSMENT: 0-10

## 2024-07-29 ENCOUNTER — APPOINTMENT (OUTPATIENT)
Dept: OPHTHALMOLOGY | Facility: CLINIC | Age: 19
End: 2024-07-29
Payer: COMMERCIAL

## 2024-07-29 DIAGNOSIS — H18.623 KERATOCONUS, UNSTABLE, BILATERAL: Primary | ICD-10-CM

## 2024-07-29 DIAGNOSIS — H52.213 IRREGULAR ASTIGMATISM OF BOTH EYES: ICD-10-CM

## 2024-07-29 PROCEDURE — 99213 OFFICE O/P EST LOW 20 MIN: CPT | Performed by: STUDENT IN AN ORGANIZED HEALTH CARE EDUCATION/TRAINING PROGRAM

## 2024-07-29 ASSESSMENT — SLIT LAMP EXAM - LIDS
COMMENTS: NORMAL
COMMENTS: NORMAL

## 2024-07-29 ASSESSMENT — VISUAL ACUITY
CORRECTION_TYPE: GLASSES
OS_CC: 20/100
METHOD: SNELLEN - LINEAR
OD_CC: 20/100

## 2024-07-29 ASSESSMENT — EXTERNAL EXAM - RIGHT EYE: OD_EXAM: NORMAL

## 2024-07-29 ASSESSMENT — REFRACTION_CURRENTRX
OD_BASECURVE: 8.4
OS_AXIS: 080
OD_DIAMETER: 16.0
OS_SPHERE: +1.00
OD_BRAND: SYNERGEYES VS
OD_SPHERE: +1.50
OS_BRAND: SYNERGEYES VS
OS_BASECURVE: 8.4
OS_DIAMETER: 16.0
OS_CYLINDER: -3.00
OD_CYLINDER: SPHERE

## 2024-07-29 ASSESSMENT — REFRACTION_MANIFEST
OD_CYLINDER: -3.75
OD_AXIS: 160
OD_SPHERE: -4.50
OS_AXIS: 090
OS_CYLINDER: -5.75
OS_SPHERE: -5.00

## 2024-07-29 ASSESSMENT — ENCOUNTER SYMPTOMS
CONSTITUTIONAL NEGATIVE: 0
HEMATOLOGIC/LYMPHATIC NEGATIVE: 0
CARDIOVASCULAR NEGATIVE: 0
PSYCHIATRIC NEGATIVE: 0
MUSCULOSKELETAL NEGATIVE: 0
ENDOCRINE NEGATIVE: 0
GASTROINTESTINAL NEGATIVE: 0
ALLERGIC/IMMUNOLOGIC NEGATIVE: 0
NEUROLOGICAL NEGATIVE: 0
RESPIRATORY NEGATIVE: 0
EYES NEGATIVE: 0

## 2024-07-29 ASSESSMENT — EXTERNAL EXAM - LEFT EYE: OS_EXAM: NORMAL

## 2024-07-29 NOTE — PROGRESS NOTES
Assessment/Plan   Diagnoses and all orders for this visit:  Keratoconus, unstable, bilateral  Irregular astigmatism of both eyes  -patient here for f/u-no longer has her last pair of scleral lenses  -reports vision was good with her contacts but it was difficult to adapt to insertion and removal-sometimes lenses gave her discomfort  -will order a new set of lenses for the patient and have her RTC when lenses arrive for a CL f/u to make sure fit is good.   -updated spec RX for her today with BCVA 20/100 OD+OS in specs  -Patient fit into trial lenses of TheWrap with good comfort/fit/and VA on over-refraction  -BCVA in scleral lenses OD 20/30 OS 20/25   -with greater than 2 lines of improvement in BCVA this lens is being fit as medically necessary and not for cosmetic purposes    RTC for CL f/u exam when lenses arrive

## 2024-07-29 NOTE — Clinical Note
Patient misplaced/ her last pair of contacts-please order synergeyes VS lenses in finalized RX. Patient will need f/u exam when lenses arrive. Thanks!

## 2024-08-09 ENCOUNTER — APPOINTMENT (OUTPATIENT)
Dept: OBSTETRICS AND GYNECOLOGY | Facility: HOSPITAL | Age: 19
End: 2024-08-09
Payer: COMMERCIAL

## 2024-09-06 ENCOUNTER — APPOINTMENT (OUTPATIENT)
Dept: OPHTHALMOLOGY | Facility: CLINIC | Age: 19
End: 2024-09-06
Payer: COMMERCIAL

## 2024-09-06 DIAGNOSIS — H52.213 IRREGULAR ASTIGMATISM OF BOTH EYES: ICD-10-CM

## 2024-09-06 DIAGNOSIS — H18.623 KERATOCONUS, UNSTABLE, BILATERAL: Primary | ICD-10-CM

## 2024-09-06 ASSESSMENT — REFRACTION_CURRENTRX
OD_BASECURVE: 8.4
OD_SPHERE: +1.50
OS_CYLINDER: -3.00
OS_AXIS: 080
OD_DIAMETER: 16.0
OD_BRAND: SYNERGEYES VS
OS_SPHERE: +1.00
OS_BASECURVE: 8.4
OD_CYLINDER: SPHERE
OS_DIAMETER: 16.0
OS_BRAND: SYNERGEYES VS

## 2024-09-06 ASSESSMENT — SLIT LAMP EXAM - LIDS
COMMENTS: NORMAL
COMMENTS: NORMAL

## 2024-09-06 ASSESSMENT — VISUAL ACUITY
OS_CC: CF
METHOD: SNELLEN - LINEAR
VA_OR_OD_CURRENT_RX: 20/25+2
OD_CC: CF
VA_OR_OS_CURRENT_RX: 20/25-2

## 2024-09-06 ASSESSMENT — EXTERNAL EXAM - LEFT EYE: OS_EXAM: NORMAL

## 2024-09-06 ASSESSMENT — EXTERNAL EXAM - RIGHT EYE: OD_EXAM: NORMAL

## 2024-09-06 NOTE — PROGRESS NOTES
Assessment/Plan   Diagnoses and all orders for this visit:  Keratoconus, unstable, bilateral  Irregular astigmatism of both eyes  -patient here for Synergeyes VS pickup with overall good comfort and fit  -will make small adjustments to each lens with an over-refraction added and increasing the right lens clearance by 100um  -will order lens changes and we can ship direct to patient    RTC for f/u annual exam with dilation 1/2025

## 2024-09-06 NOTE — Clinical Note
Good morning! I finalized the Synergeyes VS lens for Shyann. In the finalized RX I adjusted the right lens by adding 100um to the clearance and adding the over-refraction. For the left lens I add the over-refraction. Could you order an exchange with these new parameters listed under the final CL RX. We can ship this new lens directly to the patient. Thanks!

## 2024-09-07 ASSESSMENT — ENCOUNTER SYMPTOMS
ALLERGIC/IMMUNOLOGIC NEGATIVE: 0
RESPIRATORY NEGATIVE: 0
HEMATOLOGIC/LYMPHATIC NEGATIVE: 0
CONSTITUTIONAL NEGATIVE: 0
NEUROLOGICAL NEGATIVE: 0
MUSCULOSKELETAL NEGATIVE: 0
GASTROINTESTINAL NEGATIVE: 0
PSYCHIATRIC NEGATIVE: 0
ENDOCRINE NEGATIVE: 0
CARDIOVASCULAR NEGATIVE: 0
EYES NEGATIVE: 0

## 2024-11-14 ENCOUNTER — APPOINTMENT (OUTPATIENT)
Dept: OBSTETRICS AND GYNECOLOGY | Facility: CLINIC | Age: 19
End: 2024-11-14
Payer: COMMERCIAL

## 2024-12-16 ENCOUNTER — APPOINTMENT (OUTPATIENT)
Dept: OBSTETRICS AND GYNECOLOGY | Facility: CLINIC | Age: 19
End: 2024-12-16
Payer: COMMERCIAL

## 2024-12-16 VITALS
WEIGHT: 154.6 LBS | SYSTOLIC BLOOD PRESSURE: 129 MMHG | RESPIRATION RATE: 16 BRPM | BODY MASS INDEX: 30.19 KG/M2 | HEART RATE: 86 BPM | DIASTOLIC BLOOD PRESSURE: 85 MMHG

## 2024-12-16 DIAGNOSIS — Z87.42 HISTORY OF PCOS: ICD-10-CM

## 2024-12-16 DIAGNOSIS — Z20.2 POSSIBLE EXPOSURE TO STI: ICD-10-CM

## 2024-12-16 PROCEDURE — 87491 CHLMYD TRACH DNA AMP PROBE: CPT

## 2024-12-16 PROCEDURE — 99213 OFFICE O/P EST LOW 20 MIN: CPT | Performed by: NURSE PRACTITIONER

## 2024-12-16 PROCEDURE — 87661 TRICHOMONAS VAGINALIS AMPLIF: CPT

## 2024-12-16 PROCEDURE — 87591 N.GONORRHOEAE DNA AMP PROB: CPT

## 2024-12-16 PROCEDURE — 1036F TOBACCO NON-USER: CPT | Performed by: NURSE PRACTITIONER

## 2024-12-16 ASSESSMENT — PAIN SCALES - GENERAL: PAINLEVEL_OUTOF10: 0-NO PAIN

## 2024-12-16 NOTE — PROGRESS NOTES
Subjective   Patient ID: Shyann Fulton is a 19 y.o. female who presents for No chief complaint on file..  HPI  19-year-old  here today with questions about PCOS and her inability to get pregnant.  She would also like STD screening.    Patient has had blood work for PCOS with normal results other than elevated testosterone.  She reports facial hair growth.  She had a pelvic ultrasound showing a hemorrhagic cyst but ovaries not consistent with polycystic changes.  Her menstrual cycles used to be infrequent but over the past year they have become more normalized.    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  She is tracking her periods and ovulation.  Review of Systems   Genitourinary:  Positive for menstrual problem and pelvic pain.   All other systems reviewed and are negative.      Objective   Physical Exam  Abdominal:      Palpations: Abdomen is soft.   Genitourinary:     General: Normal vulva.      Exam position: Lithotomy position.      Labia:         Right: No rash, tenderness or lesion.         Left: No rash, tenderness or lesion.       Vagina: No vaginal discharge.      Cervix: No lesion or erythema.   Musculoskeletal:      Cervical back: Normal range of motion.   Skin:     General: Skin is warm.   Neurological:      Mental Status: She is alert.   Psychiatric:         Mood and Affect: Mood normal.         Assessment/Plan   Problem List Items Addressed This Visit    None  Visit Diagnoses         Codes    History of PCOS     Z87.42    Possible exposure to STI     Z20.2        Continue to track periods and ovulation.  Continue prenatal vitamins.         Negar Lobo, KJ-CNP 24 3:03 PM

## 2024-12-17 LAB
C TRACH RRNA SPEC QL NAA+PROBE: NEGATIVE
N GONORRHOEA DNA SPEC QL PROBE+SIG AMP: NEGATIVE
T VAGINALIS RRNA SPEC QL NAA+PROBE: NEGATIVE

## 2025-01-24 ENCOUNTER — APPOINTMENT (OUTPATIENT)
Dept: OBSTETRICS AND GYNECOLOGY | Facility: HOSPITAL | Age: 20
End: 2025-01-24
Payer: COMMERCIAL

## 2025-02-24 ENCOUNTER — TELEPHONE (OUTPATIENT)
Dept: OPHTHALMOLOGY | Facility: CLINIC | Age: 20
End: 2025-02-24
Payer: COMMERCIAL

## 2025-02-24 NOTE — TELEPHONE ENCOUNTER
Left a  for a return call to order contact lenses. Also need new insurance information.  She no longer has [facility].

## 2025-02-26 ENCOUNTER — TELEPHONE (OUTPATIENT)
Dept: OPHTHALMOLOGY | Facility: CLINIC | Age: 20
End: 2025-02-26
Payer: COMMERCIAL

## 2025-03-10 ENCOUNTER — TELEPHONE (OUTPATIENT)
Dept: OPHTHALMOLOGY | Facility: CLINIC | Age: 20
End: 2025-03-10

## 2025-04-29 ENCOUNTER — APPOINTMENT (OUTPATIENT)
Dept: PRIMARY CARE | Facility: CLINIC | Age: 20
End: 2025-04-29

## 2025-05-01 ENCOUNTER — TELEPHONE (OUTPATIENT)
Dept: OPHTHALMOLOGY | Facility: CLINIC | Age: 20
End: 2025-05-01
Payer: COMMERCIAL

## 2025-05-01 NOTE — TELEPHONE ENCOUNTER
Need insurance information.  Received a VM to order lenses using insurance.  The only insurance in system is [employer] and she doesn't have that anymore.

## 2025-05-09 ENCOUNTER — TELEPHONE (OUTPATIENT)
Dept: OPHTHALMOLOGY | Facility: CLINIC | Age: 20
End: 2025-05-09
Payer: COMMERCIAL

## 2025-05-21 ENCOUNTER — APPOINTMENT (OUTPATIENT)
Dept: PRIMARY CARE | Facility: CLINIC | Age: 20
End: 2025-05-21
Payer: COMMERCIAL

## 2025-05-23 ENCOUNTER — TELEPHONE (OUTPATIENT)
Dept: OPHTHALMOLOGY | Age: 20
End: 2025-05-23
Payer: COMMERCIAL

## 2025-06-05 ENCOUNTER — TELEPHONE (OUTPATIENT)
Dept: OPHTHALMOLOGY | Facility: CLINIC | Age: 20
End: 2025-06-05
Payer: COMMERCIAL

## 2025-06-05 NOTE — TELEPHONE ENCOUNTER
Trying to reach patient to order sleral lenses.  She says she has insurance.  Only insurance we have is inactive.  Lens are $900.  I have left many VM and I do receive calls back, but no information is ever left.

## 2025-06-06 ENCOUNTER — TELEPHONE (OUTPATIENT)
Dept: OPHTHALMOLOGY | Facility: CLINIC | Age: 20
End: 2025-06-06
Payer: COMMERCIAL

## 2025-06-18 ENCOUNTER — TELEPHONE (OUTPATIENT)
Dept: OPHTHALMOLOGY | Facility: CLINIC | Age: 20
End: 2025-06-18
Payer: COMMERCIAL

## 2025-06-18 NOTE — TELEPHONE ENCOUNTER
Wanted to order lenses.  I submitted to Medicaid and it was denied.  Shyann will call to order when ready.

## 2025-07-01 ENCOUNTER — APPOINTMENT (OUTPATIENT)
Dept: PRIMARY CARE | Facility: CLINIC | Age: 20
End: 2025-07-01
Payer: COMMERCIAL

## 2025-07-08 ENCOUNTER — DOCUMENTATION (OUTPATIENT)
Dept: OPHTHALMOLOGY | Facility: CLINIC | Age: 20
End: 2025-07-08
Payer: COMMERCIAL

## 2025-07-08 NOTE — LETTER
July 8, 2025    Shyann Fulton     Patient: shyann Fulton   YOB: 2005           To whom it concerns,    Shyann Fulton is being seen by the [facility] for a condition called Keratoconus. Keratoconus causes visual blur, glare, and haloes. She is currently in the process of ordering a special contact lens to help improve her vision. Please allow for accommodations during her classes prior to her receiving her contact lenses due to her decreased vision.        Sincerely,        Akash Horn, OD

## 2025-08-22 ENCOUNTER — APPOINTMENT (OUTPATIENT)
Dept: PRIMARY CARE | Facility: CLINIC | Age: 20
End: 2025-08-22
Payer: COMMERCIAL

## 2025-08-29 ENCOUNTER — APPOINTMENT (OUTPATIENT)
Dept: PRIMARY CARE | Facility: CLINIC | Age: 20
End: 2025-08-29
Payer: COMMERCIAL